# Patient Record
Sex: MALE | Race: WHITE | NOT HISPANIC OR LATINO | Employment: FULL TIME | URBAN - METROPOLITAN AREA
[De-identification: names, ages, dates, MRNs, and addresses within clinical notes are randomized per-mention and may not be internally consistent; named-entity substitution may affect disease eponyms.]

---

## 2018-01-18 ENCOUNTER — OFFICE VISIT (OUTPATIENT)
Dept: OPHTHALMOLOGY | Facility: CLINIC | Age: 23
End: 2018-01-18
Attending: OPHTHALMOLOGY

## 2018-01-18 ENCOUNTER — HOSPITAL ENCOUNTER (OUTPATIENT)
Facility: CLINIC | Age: 23
End: 2018-01-18
Attending: OPHTHALMOLOGY | Admitting: OPHTHALMOLOGY
Payer: COMMERCIAL

## 2018-01-18 DIAGNOSIS — Z01.00 EXAMINATION OF EYES AND VISION: Primary | ICD-10-CM

## 2018-01-18 DIAGNOSIS — H52.13 MYOPIA OF BOTH EYES: Primary | ICD-10-CM

## 2018-01-18 PROCEDURE — G0463 HOSPITAL OUTPT CLINIC VISIT: HCPCS | Mod: ZF

## 2018-01-18 ASSESSMENT — REFRACTION_WEARINGRX
OS_SPHERE: -5.25
OS_AXIS: 089
SPECS_TYPE: SVL
OD_SPHERE: -5.50
OD_CYLINDER: +2.00
OS_CYLINDER: +1.75
OD_AXIS: 108

## 2018-01-18 ASSESSMENT — PACHYMETRY
OD_CT(UM): 537
OS_CT(UM): 545

## 2018-01-18 ASSESSMENT — TONOMETRY
OS_IOP_MMHG: 20
IOP_METHOD: ICARE
OD_IOP_MMHG: 18

## 2018-01-18 ASSESSMENT — REFRACTION_MANIFEST
OD_AXIS: 103
OD_SPHERE: -5.50
OS_SPHERE: -5.25
OS_CYLINDER: +2.25
OD_CYLINDER: +2.00
OS_AXIS: 088

## 2018-01-18 ASSESSMENT — KERATOMETRY
OS_AXISANGLE2_DEGREES: 171
OD_K2POWER_DIOPTERS: 45.25
OS_AXISANGLE_DEGREES: 081
OS_K1POWER_DIOPTERS: 42.75
OD_K1POWER_DIOPTERS: 42.75
OD_AXISANGLE_DEGREES: 100
OS_K2POWER_DIOPTERS: 45.50
OD_AXISANGLE2_DEGREES: 010

## 2018-01-18 ASSESSMENT — REFRACTION
OD_SPHERE: -5.25
OS_CYLINDER: +2.25
OS_SPHERE: -5.25
OD_AXIS: 103
OD_CYLINDER: +2.00
OS_AXIS: 088

## 2018-01-18 ASSESSMENT — CUP TO DISC RATIO
OD_RATIO: 0.1
OS_RATIO: 0.1

## 2018-01-18 ASSESSMENT — SLIT LAMP EXAM - LIDS
COMMENTS: NORMAL
COMMENTS: NORMAL

## 2018-01-18 ASSESSMENT — VISUAL ACUITY
OD_SC: 20/300
OS_CC: J1+
OS_CC: 20/20
METHOD: SNELLEN - LINEAR
OS_SC: 20/300
OS_CC+: -3
OS_SC: J1
OD_SC: J1
OD_CC: 20/20
CORRECTION_TYPE: GLASSES
OD_CC+: -1
OD_CC: J1+

## 2018-01-18 ASSESSMENT — CONF VISUAL FIELD
OD_NORMAL: 1
OS_NORMAL: 1

## 2018-01-18 ASSESSMENT — EXTERNAL EXAM - LEFT EYE: OS_EXAM: NORMAL

## 2018-01-18 ASSESSMENT — EXTERNAL EXAM - RIGHT EYE: OD_EXAM: NORMAL

## 2018-01-18 NOTE — NURSING NOTE
Chief Complaints and History of Present Illnesses   Patient presents with     Consult For     LASIK     HPI    Affected eye(s):  Both   Symptoms:     No decreased vision   No floaters   No flashes   No redness   No foreign body sensation   No Dryness      Frequency:  Constant       Do you have eye pain now?:  No      Comments:  Pt here for LASIK consult  Pt has worn glasses x 20 years - tried CL's for about a week  Pt notes no changes in vision recently  Pt's last eye exam was just over a year ago (8/1/16 with Dr. Root at [facility])    No drops    Lisa Canada COA 9:17 AM January 18, 2018

## 2018-01-18 NOTE — MR AVS SNAPSHOT
After Visit Summary   1/18/2018    Ameya Jimenez    MRN: [id number]           Patient Information     Date Of Birth          1995        Visit Information        Provider Department      1/18/2018 9:00 AM Jarod Salguero MD [facility]        Today's Diagnoses     Myopia of both eyes    -  1       Follow-ups after your visit        Follow-up notes from your care team     Return for Follow Up postop.      Your next 10 appointments already scheduled     Mar 12, 2018   Procedure with Jarod Salguero MD   [facility] (--)    [address]  [place] [address]   [phone]            Mar 12, 2018   Procedure with Jarod Salguero MD   [facility] (--)    [address]  [place] [address]   [phone]            Mar 13, 2018  8:30 AM CDT   Post-Op with Jarod Salguero MD   Eye Clinic ([facility])    [address]  [address] [facility] [address]  [facility] [address] [phone]            Mar 20, 2018 10:30 AM CDT   Post-Op with Jarod Salguero MD   Eye Clinic ([facility])    [address]  [address]  [address] [facility] [address]  [facility] [address]   [phone]              Who to contact     Please call your clinic at [phone] to:    Ask questions about your health    Make or cancel appointments    Discuss your medicines    Learn about your test results    Speak to your doctor   If you have compliments or concerns about an experience at your clinic, or if you wish to file a complaint, please contact [facility] Patient Relations at [phone] or email us at [email or web address]         Additional Information About Your Visit        MyChart Information     [employer] gives you secure access to your electronic health record. If you see a primary care provider, you can also send messages to your care team and make  appointments. If you have questions, please call your primary care clinic.  If you do not have a primary care provider, please call [phone] and they will assist you.      [employer] is an electronic gateway that provides easy, online access to your medical records. With [employer], you can request a clinic appointment, read your test results, renew a prescription or communicate with your care team.     To access your existing account, please contact your [facility] or call [phone] for assistance.        Care EveryWhere ID     This is your Care EveryWhere ID. This could be used by other organizations to access your [place] medical records  [id number]         Blood Pressure from Last 3 Encounters:   No data found for BP    Weight from Last 3 Encounters:   No data found for Wt              We Performed the Following     Anamaria-Operative Worksheet (Ant Seg)        Primary Care Provider Fax #    Physician No Ref-Primary [phone]       No address on file        Equal Access to Services     [facility] Center[facility]WNYA : Hadii viviana perezo Darian, waaxda luqadaha, qaybta kaalmada adedennisyacleveland, bill gonzalez . So [facility] [phone].    ATENCIÓN: Si habla español, tiene a lin disposición servicios gratuitos de asistencia lingüística. Llame al [phone].    We comply with applicable federal civil rights laws and [place] laws. We do not discriminate on the basis of race, color, national origin, age, disability, sex, sexual orientation, or gender identity.            Thank you!     Thank you for choosing EYE CLINIC  for your care. Our goal is always to provide you with excellent care. Hearing back from our patients is one way we can continue to improve our services. Please take a few minutes to complete the written survey that you may receive in the mail after your visit with us. Thank you!             Your Updated Medication List - Protect others around you: Learn how to  safely use, store and throw away your medicines at www.disposemymeds.org.          This list is accurate as of: 1/18/18 12:22 PM.  Always use your most recent med list.                   Brand Name Dispense Instructions for use Diagnosis    VITAMIN D (CHOLECALCIFEROL) PO      Take 2,000 Int'l Units by mouth daily

## 2018-01-18 NOTE — PROGRESS NOTES
New laser in situ keratomileusis (LASIK) claudia     A 22 yr male    Says he is sick of glasses. He is planning to travel to [place] for 1 month and hopes to be out of glasses then.    History of trying CL for 1 week at age 12 yrs, did not tolerate CL and never tried again. His brother had K abrasion from CL and patient did not want to deal with complications.      Current MRx 4 years old. There was quarter diopter change in prescription over last 4 years.    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 History of archery, and says he switches his dominant eye     Pupil: 5.5 (light), 3.5 (dark) mm OU     Dry eye symptoms: none    history of color vision deficiency OU     Counseled at length re R/B/A refractive surgery, including laser in situ keratomileusis (LASIK) vs Photorefractive keratectomy (PRK)     Dry eye, night vision complaints, haloes, reduced contrast, flap complications, infection, vision loss, presbyopia, complexity of future intraocular lens calculations for cataract surgery, need for additional surgery all reviewed as risks     Monovision discussed, he prefers distance correction OU     Post LASIK presbyopia discussed.     Patient is very motivated and wishes to schedule surgery OU      REGIS Antoine  Cornea fellow          ~~~~~~~~~~~~~~~~~~~~~~~~~~~~~~~~~~~~~~~~~~~~~~~~~~~~~~~~~~~~~~~~    Complete documentation of historical and exam elements from today's encounter can be found in the full encounter summary report (not reduplicated in this progress note). I personally obtained the chief complaint(s) and history of present illness.  I confirmed and edited as necessary the review of systems,  past medical/surgical history, family history, social history, and examination findings as documented by others.  I examined the patient myself, and I personally reviewed the relevant tests, images, and reports as documented above. I formulated and edited as necessary the assessment and plan and discussed the findings and management plan with the patient and family.     Jarod Salguero MD, MA  Director, Cornea & Anterior Segment  [facility] Department of Ophthalmology & Visual Neuroscience

## 2018-03-12 ENCOUNTER — HOSPITAL ENCOUNTER (OUTPATIENT)
Facility: CLINIC | Age: 23
Discharge: HOME | End: 2018-03-12
Attending: OPHTHALMOLOGY | Admitting: OPHTHALMOLOGY
Payer: COMMERCIAL

## 2018-03-12 ENCOUNTER — APPOINTMENT (OUTPATIENT)
Dept: ADMISSION | Facility: CLINIC | Age: 23
End: 2018-03-12
Attending: OPHTHALMOLOGY

## 2018-03-12 ENCOUNTER — SURGERY (OUTPATIENT)
Age: 23
End: 2018-03-12

## 2018-03-12 PROCEDURE — 65760 KERATOMILEUSIS: CPT | Performed by: OPHTHALMOLOGY

## 2018-03-12 PROCEDURE — 92499 UNLISTED OPH SVC/PROCEDURE: CPT | Performed by: OPHTHALMOLOGY

## 2018-03-13 ENCOUNTER — OFFICE VISIT (OUTPATIENT)
Dept: OPHTHALMOLOGY | Facility: CLINIC | Age: 23
End: 2018-03-13
Attending: OPHTHALMOLOGY
Payer: COMMERCIAL

## 2018-03-13 DIAGNOSIS — Z98.890 STATUS POST LASIK SURGERY: Primary | ICD-10-CM

## 2018-03-13 PROCEDURE — G0463 HOSPITAL OUTPT CLINIC VISIT: HCPCS | Mod: ZF

## 2018-03-13 ASSESSMENT — VISUAL ACUITY
OD_SC+: -1
OS_SC: 20/20
OD_SC: 20/20
OS_SC+: -2
METHOD: SNELLEN - LINEAR

## 2018-03-13 ASSESSMENT — EXTERNAL EXAM - LEFT EYE: OS_EXAM: NORMAL

## 2018-03-13 ASSESSMENT — SLIT LAMP EXAM - LIDS
COMMENTS: NORMAL
COMMENTS: NORMAL

## 2018-03-13 ASSESSMENT — EXTERNAL EXAM - RIGHT EYE: OD_EXAM: NORMAL

## 2018-03-13 NOTE — MR AVS SNAPSHOT
After Visit Summary   3/13/2018    Ameya Jimenez    MRN: [id number]           Patient Information     Date Of Birth          1995        Visit Information        Provider Department      3/13/2018 8:30 AM Jarod Salguero MD [facility]        Today's Diagnoses     Status post LASIK surgery    -  1       Follow-ups after your visit        Follow-up notes from your care team     Return in about 1 week (around 3/20/2018) for Follow Up.      Your next 10 appointments already scheduled     May 15, 2018  8:15 AM CDT   Post-Op with Jarod Salguero MD   Eye Clinic ([facility])    [address] [place] [address] [phone]              Who to contact     Please call your clinic at [phone] to:    Ask questions about your health    Make or cancel appointments    Discuss your medicines    Learn about your test results    Speak to your doctor            Additional Information About Your Visit        MyChart Information     [employer] gives you secure access to your electronic health record. If you see a primary care provider, you can also send messages to your care team and make appointments. If you have questions, please call your primary care clinic.  If you do not have a primary care provider, please call [phone] and they will assist you.      [employer] is an electronic gateway that provides easy, online access to your medical records. With [employer], you can request a clinic appointment, read your test results, renew a prescription or communicate with your care team.     To access your existing account, please contact your [facility] or call [phone] for assistance.        Care EveryWhere ID     This is your Care EveryWhere ID. This could be used by other organizations to access your [place] medical records  [id number]         Blood Pressure from Last 3 Encounters:   No data found for BP     Weight from Last 3 Encounters:   No data found for Wt              Today, you had the following     No orders found for display       Primary Care Provider Fax #    Physician No Ref-Primary [phone]       No address on file        Equal Access to Services     FELIZAURA ELBERT : Yulia viviana morales ninoska Farmer, wasuzannada luqadaha, qaybta kaalmada linsey, bill milton laLeonstepan mesa. So [facility] [phone].    ATENCIÓN: Si habla español, tiene a lin disposición servicios gratuitos de asistencia lingüística. Llame al [phone].    We comply with applicable federal civil rights laws and [place] laws. We do not discriminate on the basis of race, color, national origin, age, disability, sex, sexual orientation, or gender identity.            Thank you!     Thank you for choosing EYE CLINIC  for your care. Our goal is always to provide you with excellent care. Hearing back from our patients is one way we can continue to improve our services. Please take a few minutes to complete the written survey that you may receive in the mail after your visit with us. Thank you!             Your Updated Medication List - Protect others around you: Learn how to safely use, store and throw away your medicines at www.disposemymeds.org.          This list is accurate as of 3/13/18  9:53 AM.  Always use your most recent med list.                   Brand Name Dispense Instructions for use Diagnosis    VITAMIN D (CHOLECALCIFEROL) PO      Take 2,000 Int'l Units by mouth daily

## 2018-03-13 NOTE — NURSING NOTE
Chief Complaints and History of Present Illnesses   Patient presents with     Post Op (Ophthalmology) Both Eyes     1 day s/p lasik BE     HPI    Affected eye(s):  Both   Symptoms:        Duration:  1 day   Frequency:  Constant       Do you have eye pain now?:  No      Comments:  Pt. States that yesterday went well.  No pain, just some discomfort BE.  VA has improved BE.  Corine Hernandez COT 8:43 AM March 13, 2018

## 2018-03-13 NOTE — PROGRESS NOTES
PODx1 S/p laser in situ keratomileusis (LASIK) both eyes     Interval history:   Doing well. No significant pain overnight. Fog/haze has become less prominent over the last 24 hours. Some mild irritation, and Subconjunctival hemorrhage right eye otherwise doing well.        Plan:  Off to a good start vision is 20/20 both eyes today   Reviewed instructions post operatively with patient  Start Ofloxacin four times a day  Start prednisolone four times a day  With weekly taper  Start ketorolac four times a day  4 day course  Start PFAT four times a day  And as needed therafter     Return to clinic 2 mo earlier as needed         Linnea Strange MD  Ophthalmology PGY3       ~~~~~~~~~~~~~~~~~~~~~~~~~~~~~~~~~~~~~~~~~~~~~~~~~~~~~~~~~~~~~~~~    Complete documentation of historical and exam elements from today's encounter can be found in the full encounter summary report (not reduplicated in this progress note). I personally obtained the chief complaint(s) and history of present illness.  I confirmed and edited as necessary the review of systems, past medical/surgical history, family history, social history, and examination findings as documented by others.  I examined the patient myself, and I personally reviewed the relevant tests, images, and reports as documented above. I formulated and edited as necessary the assessment and plan and discussed the findings and management plan with the patient and family.     Jarod Salguero MD, MA  Director, Cornea & Anterior Segment  [facility] Department of Ophthalmology & Visual Neuroscience

## 2018-05-15 ENCOUNTER — OFFICE VISIT (OUTPATIENT)
Dept: OPHTHALMOLOGY | Facility: CLINIC | Age: 23
End: 2018-05-15
Attending: OPHTHALMOLOGY
Payer: COMMERCIAL

## 2018-05-15 DIAGNOSIS — Z98.890 S/P LASIK SURGERY: Primary | ICD-10-CM

## 2018-05-15 PROCEDURE — 92025 CPTRIZED CORNEAL TOPOGRAPHY: CPT | Mod: ZF | Performed by: OPHTHALMOLOGY

## 2018-05-15 PROCEDURE — G0463 HOSPITAL OUTPT CLINIC VISIT: HCPCS | Mod: ZF

## 2018-05-15 ASSESSMENT — TONOMETRY
OS_IOP_MMHG: 14
IOP_METHOD: ICARE
OD_IOP_MMHG: 13

## 2018-05-15 ASSESSMENT — SLIT LAMP EXAM - LIDS
COMMENTS: NORMAL
COMMENTS: NORMAL

## 2018-05-15 ASSESSMENT — VISUAL ACUITY
METHOD: SNELLEN - LINEAR
OS_SC: 20/20
OD_SC: 20/20

## 2018-05-15 ASSESSMENT — EXTERNAL EXAM - LEFT EYE: OS_EXAM: NORMAL

## 2018-05-15 ASSESSMENT — CONF VISUAL FIELD
OD_NORMAL: 1
OS_NORMAL: 1
METHOD: COUNTING FINGERS

## 2018-05-15 ASSESSMENT — REFRACTION_WEARINGRX: SPECS_TYPE: SVL

## 2018-05-15 ASSESSMENT — EXTERNAL EXAM - RIGHT EYE: OD_EXAM: NORMAL

## 2018-05-15 NOTE — MR AVS SNAPSHOT
After Visit Summary   5/15/2018    Ameya Jimenez    MRN: [id number]           Patient Information     Date Of Birth          1995        Visit Information        Provider Department      5/15/2018 8:15 AM Jarod Salguero MD [facility]        Today's Diagnoses     S/P LASIK surgery    -  1       Follow-ups after your visit        Who to contact     Please call your clinic at [phone] to:    Ask questions about your health    Make or cancel appointments    Discuss your medicines    Learn about your test results    Speak to your doctor            Additional Information About Your Visit        MyChart Information     [employer] gives you secure access to your electronic health record. If you see a primary care provider, you can also send messages to your care team and make appointments. If you have questions, please call your primary care clinic.  If you do not have a primary care provider, please call [phone] and they will assist you.      [employer] is an electronic gateway that provides easy, online access to your medical records. With [employer], you can request a clinic appointment, read your test results, renew a prescription or communicate with your care team.     To access your existing account, please contact your [facility] or call [phone] for assistance.        Care EveryWhere ID     This is your Care EveryWhere ID. This could be used by other organizations to access your [place] medical records  [id number]         Blood Pressure from Last 3 Encounters:   No data found for BP    Weight from Last 3 Encounters:   No data found for Wt              We Performed the Following     Corneal Topography OU (both eyes)        Primary Care Provider Fax #    Physician No Ref-Primary [phone]       No address on file        Equal Access to Services     SENA BOSWELL : Yulia Farmer, johanna wu, bill laughlin  liyah moerajathellen royLeonstepan ah. So [facility] [phone].    ATENCIÓN: Si habla osiris, tiene a lin disposición servicios gratuitos de asistencia lingüística. Tangela al [phone].    We comply with applicable federal civil rights laws and [place] laws. We do not discriminate on the basis of race, color, national origin, age, disability, sex, sexual orientation, or gender identity.            Thank you!     Thank you for choosing EYE CLINIC  for your care. Our goal is always to provide you with excellent care. Hearing back from our patients is one way we can continue to improve our services. Please take a few minutes to complete the written survey that you may receive in the mail after your visit with us. Thank you!             Your Updated Medication List - Protect others around you: Learn how to safely use, store and throw away your medicines at www.disposemymeds.org.          This list is accurate as of 5/15/18  9:13 AM.  Always use your most recent med list.                   Brand Name Dispense Instructions for use Diagnosis    VITAMIN D (CHOLECALCIFEROL) PO      Take 2,000 Int'l Units by mouth daily

## 2018-05-15 NOTE — PROGRESS NOTES
POMx2 S/p laser in situ keratomileusis (LASIK) both eyes     Interval history:   Doing well. Vision has improved.  Some mild dryness improved by drops.       Plan:  Doing great   vision is 20/20 both eyes today   Continue PFAT as needed    F/u 1 year    Ab Moore, DO  Cornea Fellow      ~~~~~~~~~~~~~~~~~~~~~~~~~~~~~~~~~~~~~~~~~~~~~~~~~~~~~~~~~~~~~~~~    Complete documentation of historical and exam elements from today's encounter can be found in the full encounter summary report (not reduplicated in this progress note). I personally obtained the chief complaint(s) and history of present illness.  I confirmed and edited as necessary the review of systems, past medical/surgical history, family history, social history, and examination findings as documented by others.  I examined the patient myself, and I personally reviewed the relevant tests, images, and reports as documented above. I formulated and edited as necessary the assessment and plan and discussed the findings and management plan with the patient and family.     Jarod Salguero MD, MA  Director, Cornea & Anterior Segment  [facility] Department of Ophthalmology & Visual Neuroscience

## 2020-02-14 ENCOUNTER — OFFICE VISIT (OUTPATIENT)
Dept: DERMATOLOGY | Facility: CLINIC | Age: 25
End: 2020-02-14
Payer: COMMERCIAL

## 2020-02-14 VITALS — DIASTOLIC BLOOD PRESSURE: 74 MMHG | HEART RATE: 61 BPM | SYSTOLIC BLOOD PRESSURE: 117 MMHG | OXYGEN SATURATION: 98 %

## 2020-02-14 DIAGNOSIS — L73.1 PSEUDOFOLLICULITIS BARBAE: ICD-10-CM

## 2020-02-14 DIAGNOSIS — L70.0 ACNE VULGARIS: Primary | ICD-10-CM

## 2020-02-14 RX ORDER — TRETINOIN 0.25 MG/G
CREAM TOPICAL
Qty: 45 G | Refills: 3 | Status: SHIPPED | OUTPATIENT
Start: 2020-02-14 | End: 2020-05-06

## 2020-02-14 RX ORDER — BENZOYL PEROXIDE 10 G/100G
SUSPENSION TOPICAL
Qty: 227 G | Refills: 5 | Status: SHIPPED | OUTPATIENT
Start: 2020-02-14 | End: 2020-05-06

## 2020-02-14 RX ORDER — CLINDAMYCIN PHOSPHATE 10 UG/ML
LOTION TOPICAL 2 TIMES DAILY
Qty: 60 ML | Refills: 11 | Status: SHIPPED | OUTPATIENT
Start: 2020-02-14 | End: 2020-05-06

## 2020-02-14 RX ORDER — OMEPRAZOLE 10 MG/1
CAPSULE, DELAYED RELEASE ORAL
COMMUNITY

## 2020-02-14 ASSESSMENT — PAIN SCALES - GENERAL: PAINLEVEL: NO PAIN (0)

## 2020-02-14 NOTE — LETTER
2/14/2020       RE: Ameya Jimenez  [address]  [address]  [place] [address]     Dear Colleague,    Thank you for referring your patient, Ameya Jimenez, to the [facility] DERMATOLOGY at [facility]. Please see a copy of my visit note below.    [facility] Dermatology Note      Dermatology Problem List:  1. Acne vulgaris/pseudofolliculits barbae  - BPO/clindamycin, tretinoin 0.025% cream    Encounter Date: Feb 14, 2020    CC:   Chief Complaint   Patient presents with     Derm Problem     Ameya, is being seen today for a  consult regarding acne on face & shoulders, as reported by patient.       History of Present Illness:  Mr. Ameya Jmienez is a 24 year old male who presents in self referral for acne.    He has a history of acne since puberty. It has been persistent since that time on the face, shoulders, back and flanks. He has a mixture of closed comedones and painful red cysts, usually on the forehead, temples. He occasionally gets pimples on his neck and jawline, usually related to shaving. He shaves every other day and uses a safety razor, which he changes often. He had a short course of Accutane when he was a teenager, but did not complete a full course (finished 3/4). He noted some improvement afterwards in the severity of his acne. He currently uses Cetaphil cleanser and Cera Ve moisturizer. He has not used any topical prescriptions for acne.      Pertinent Social History: Chemistry PhD student at the [employer]    Past Medical History:   Patient Active Problem List   Diagnosis     Acne vulgaris     Pseudofolliculitis barbae     No significant past medical history    No past medical history on file.  Past Surgical History:   Procedure Laterality Date     KNEE SURGERY Left 09/2006    repair damage from dislocation     LASIK CUSTOMVUE BILATERAL Bilateral 3/12/2018    Procedure: LASIK CUSTOMVUE BILATERAL;  BILATERALCUSTOMUVE LASIK WITH INTRALASE;   Surgeon: Jarod Salguero MD;  Location: [facility]     sinoplasty  06/2013     WAVESCAN SCREENING Bilateral 3/12/2018    Procedure: WAVESCAN SCREENING;  BILATERAL WAVESCAN;  Surgeon: Jarod Salguero MD;  Location: [facility]       Social History:  Patient reports that he has never smoked. He has never used smokeless tobacco.    Family History:  Family History   Problem Relation Age of Onset     Diabetes Mother      Glaucoma No family hx of      Macular Degeneration No family hx of      FH of severe acne in both parents, even as adults.    Medications:  Current Outpatient Medications   Medication Sig Dispense Refill     benzoyl peroxide (PANOXYL) 10 % external liquid Use daily as directed 227 g 5     cetirizine HCl 10 MG CAPS        clindamycin (CLEOCIN T) 1 % external lotion Apply topically 2 times daily Apply after BPO wash 60 mL 11     omeprazole (PRILOSEC) 10 MG DR capsule        tretinoin (RETIN-A) 0.025 % external cream Use every other night for 1 week, increase to nightly as tolerated 45 g 3     VITAMIN D, CHOLECALCIFEROL, PO Take 2,000 Int'l Units by mouth daily          Allergies   Allergen Reactions     Opium Nausea and Vomiting     opiates         Review of Systems:  - As per HPI  - Constitutional: Otherwise feeling well today, in usual state of health.  - HEENT: Patient denies nonhealing oral sores.  - Skin: As above in HPI. No additional skin concerns.    Physical exam:  Vitals: /74 (BP Location: Right arm, Patient Position: Chair, Cuff Size: Adult Large)   Pulse 61   SpO2 98%   GEN: This is a well developed, well-nourished male in no acute distress, in a pleasant mood.    SKIN: Acne exam, which includes the face, neck, upper central chest, abdomen, and upper central back was performed.  - Coleman skin type: III  - Few closed comedones on the anterior lower neck, temples, flanks  - Few pink papules and cysts on the underside of the jaw and anterior lower neck  - Excoriated pink papules on the  upper back and central chest  - Hyperpigmented macules in prior areas of breakouts on the forehead, temples, cheeks, and back  - Multiple striae on the bilateral flanks  - No other lesions of concern on areas examined.     Impression/Plan:    1. Acne vulgaris, mild to moderate with component of pseudofolliculitis barbae  - Recommend BPO 5% or 10% wash daily to face and back, handout provided  - Clindamycin 1% lotion to face and back, after BPO wash  - At night, use Cetaphil cleanser, then tretinoin 0.025% cream nightly. Counseled patient to slowly titrate up use as tolerated  - Future consideration: 3 month course of doxycycline  - Patient currently does not have severe acne to warrant isotretinoin, but this may be entertained if he remains recalcitrant    Follow-up in 3 months, earlier for new or changing lesions.       Dr. Waite staffed the patient.    Rita Ko MD  PGY-3 Medicine-Dermatology  Pager [phone]      Staff Involved:  Resident(Rita Ko MD)/Staff(as above)    Staff Physician Comments:   I saw and evaluated the patient with the resident and I edited the assessment and plan as documented in the note. I was present for the examination.    Ameya Waite MD   of Dermatology  Department of Dermatology  [facility]

## 2020-02-14 NOTE — PROGRESS NOTES
[facility] Dermatology Note      Dermatology Problem List:  1. Acne vulgaris/pseudofolliculits barbae  - BPO/clindamycin, tretinoin 0.025% cream    Encounter Date: Feb 14, 2020    CC:   Chief Complaint   Patient presents with     Derm Problem     Ameya, is being seen today for a  consult regarding acne on face & shoulders, as reported by patient.       History of Present Illness:  Mr. Ameya Jimenez is a 24 year old male who presents in self referral for acne.    He has a history of acne since puberty. It has been persistent since that time on the face, shoulders, back and flanks. He has a mixture of closed comedones and painful red cysts, usually on the forehead, temples. He occasionally gets pimples on his neck and jawline, usually related to shaving. He shaves every other day and uses a safety razor, which he changes often. He had a short course of Accutane when he was a teenager, but did not complete a full course (finished 3/4). He noted some improvement afterwards in the severity of his acne. He currently uses Cetaphil cleanser and Cera Ve moisturizer. He has not used any topical prescriptions for acne.      Pertinent Social History: Chemistry PhD student at the [employer]    Past Medical History:   Patient Active Problem List   Diagnosis     Acne vulgaris     Pseudofolliculitis barbae     No significant past medical history    No past medical history on file.  Past Surgical History:   Procedure Laterality Date     KNEE SURGERY Left 09/2006    repair damage from dislocation     LASIK CUSTOMVUE BILATERAL Bilateral 3/12/2018    Procedure: LASIK CUSTOMVUE BILATERAL;  BILATERALCUSTOMUVE LASIK WITH INTRALASE;  Surgeon: Jarod Salguero MD;  Location: [facility]     sinoplasty  06/2013     WAVESCAN SCREENING Bilateral 3/12/2018    Procedure: WAVESCAN SCREENING;  BILATERAL WAVESCAN;  Surgeon: Jarod Salguero MD;  Location: [facility]       Social History:  Patient reports that he has never smoked. He  has never used smokeless tobacco.    Family History:  Family History   Problem Relation Age of Onset     Diabetes Mother      Glaucoma No family hx of      Macular Degeneration No family hx of      FH of severe acne in both parents, even as adults.    Medications:  Current Outpatient Medications   Medication Sig Dispense Refill     benzoyl peroxide (PANOXYL) 10 % external liquid Use daily as directed 227 g 5     cetirizine HCl 10 MG CAPS        clindamycin (CLEOCIN T) 1 % external lotion Apply topically 2 times daily Apply after BPO wash 60 mL 11     omeprazole (PRILOSEC) 10 MG DR capsule        tretinoin (RETIN-A) 0.025 % external cream Use every other night for 1 week, increase to nightly as tolerated 45 g 3     VITAMIN D, CHOLECALCIFEROL, PO Take 2,000 Int'l Units by mouth daily          Allergies   Allergen Reactions     Opium Nausea and Vomiting     opiates         Review of Systems:  - As per HPI  - Constitutional: Otherwise feeling well today, in usual state of health.  - HEENT: Patient denies nonhealing oral sores.  - Skin: As above in HPI. No additional skin concerns.    Physical exam:  Vitals: /74 (BP Location: Right arm, Patient Position: Chair, Cuff Size: Adult Large)   Pulse 61   SpO2 98%   GEN: This is a well developed, well-nourished male in no acute distress, in a pleasant mood.    SKIN: Acne exam, which includes the face, neck, upper central chest, abdomen, and upper central back was performed.  - Coleman skin type: III  - Few closed comedones on the anterior lower neck, temples, flanks  - Few pink papules and cysts on the underside of the jaw and anterior lower neck  - Excoriated pink papules on the upper back and central chest  - Hyperpigmented macules in prior areas of breakouts on the forehead, temples, cheeks, and back  - Multiple striae on the bilateral flanks  - No other lesions of concern on areas examined.     Impression/Plan:    1. Acne vulgaris, mild to moderate with component  of pseudofolliculitis barbae  - Recommend BPO 5% or 10% wash daily to face and back, handout provided  - Clindamycin 1% lotion to face and back, after BPO wash  - At night, use Cetaphil cleanser, then tretinoin 0.025% cream nightly. Counseled patient to slowly titrate up use as tolerated  - Future consideration: 3 month course of doxycycline  - Patient currently does not have severe acne to warrant isotretinoin, but this may be entertained if he remains recalcitrant    Follow-up in 3 months, earlier for new or changing lesions.       Dr. Waite staffed the patient.    Rita Ko MD  PGY-3 Medicine-Dermatology  Pager [phone]      Staff Involved:  Resident(Rita Ko MD)/Staff(as above)    Staff Physician Comments:   I saw and evaluated the patient with the resident and I edited the assessment and plan as documented in the note. I was present for the examination.    Ameya Waite MD   of Dermatology  Department of Dermatology  [facility]

## 2020-02-14 NOTE — NURSING NOTE
Dermatology Rooming Note    Ameya Jimenez's goals for this visit include:   Chief Complaint   Patient presents with     Derm Problem     Ameya, is being seen today for a  consult regarding acne on face & shoulders, as reported by patient.     Mary Guillen LPN

## 2020-02-14 NOTE — PATIENT INSTRUCTIONS
Mild Acne  Recommendations for Care;    Wash face every night with a gentle cleanser.   o Brands of Gentle Cleanser; Neutrogena, Cetaphil, Purpose, Clinique bar, Basis and Vanicream cleansing bar.    Your doctor may recommend the use of an over the counter Benzoyl peroxide product (Neutrogena Clear Pore, Clean and Clear) and a gentle soap (Dove, Purpose, Cetaphil) or Salicylic Acid wash (Neutrogena Acne Wash). Additional recommended products: Neutrogena Oil-Free, Creamy Wash. Note- Aggressive scrubbing is NOT helpful.    A facial moisturizer should be applied. If you use makeup or sunscreen make sure that it is labeled  non-comedogenic  which means that it will not aggravate or cause acne. Try not to pick at your acne as this can delay healing and may lead to scarring.  o Brands; Vanicream, Cetaphil, Neutrogena, Clinique, CeraVe      Additional tips:    Washing your face with a gentle cleanser is recommended following an athletic activity, but do not over wash as this will make the skin more sensitive.    Try not to  pop  pimples, this can cause a delay in healing and can lead to scarring.     Make sure you are reading product labels.     Change your pillow case 1-2 times per week.     WHAT IS ACNE AND WHY DO I HAVE PIMPLES?  The medical term for  pimples  is acne. Most people get a least some acne. Many people also need acne medication. Your doctor will tell you if they think you are one of those people. The good news is that the medicine really works well when used properly.  Acne does not come from being dirty, but washing your face is part of taking care of your skin and will help keep your face clear. People with acne have glands that make more oil and are more easily plugged, causing the glands to swell and create blackheads and whiteheads. Hormones, bacteria and your inherited tendency to have acne all play a role.      Morning: Benzoyl peroxide wash, followed by clindamycin lotion    Evening: pea sized  amount of tretinoin 0.025% cream to face

## 2020-02-16 PROBLEM — L73.1 PSEUDOFOLLICULITIS BARBAE: Status: ACTIVE | Noted: 2020-02-16

## 2020-02-16 PROBLEM — L70.0 ACNE VULGARIS: Status: ACTIVE | Noted: 2020-02-16

## 2020-03-11 ENCOUNTER — HEALTH MAINTENANCE LETTER (OUTPATIENT)
Age: 25
End: 2020-03-11

## 2020-03-11 ENCOUNTER — TELEPHONE (OUTPATIENT)
Dept: DERMATOLOGY | Facility: CLINIC | Age: 25
End: 2020-03-11

## 2020-05-06 ENCOUNTER — VIRTUAL VISIT (OUTPATIENT)
Dept: DERMATOLOGY | Facility: CLINIC | Age: 25
End: 2020-05-06
Payer: COMMERCIAL

## 2020-05-06 DIAGNOSIS — L73.1 PSEUDOFOLLICULITIS BARBAE: ICD-10-CM

## 2020-05-06 DIAGNOSIS — L70.0 ACNE VULGARIS: Primary | ICD-10-CM

## 2020-05-06 RX ORDER — BENZOYL PEROXIDE 10 G/100G
SUSPENSION TOPICAL
Qty: 227 G | Refills: 11 | Status: SHIPPED | OUTPATIENT
Start: 2020-05-06

## 2020-05-06 RX ORDER — CLINDAMYCIN PHOSPHATE 10 UG/ML
LOTION TOPICAL 2 TIMES DAILY
Qty: 60 ML | Refills: 11 | Status: SHIPPED | OUTPATIENT
Start: 2020-05-06

## 2020-05-06 RX ORDER — TRETINOIN 0.25 MG/G
CREAM TOPICAL
Qty: 45 G | Refills: 4 | Status: SHIPPED | OUTPATIENT
Start: 2020-05-06

## 2020-05-06 ASSESSMENT — PAIN SCALES - GENERAL: PAINLEVEL: NO PAIN (0)

## 2020-05-06 NOTE — PROGRESS NOTES
[facility]  Store and Forward and Video (Invitation sent by: Lesia and send to e-mail at: [email or web address])      Impression and Recommendations (Patient Counseled on the Following):  1. Acne vulgaris with mild-moderate component of pseudofolliculitis barbae   Significantly improved on current topical regimen. No need for doxycyline at this time but can consider in the future as needed.   - morning: BPO 5-10% wash +/- cetaphil wash, then clindamycin 1% lotion as needed, then facial moisturizer   - evening: BPO 5-10% wash +/- cetaphil wash, then tretinoin 0.025% cream +/- clindamycin 1% lotion as needed    - recommend single blade razor to reduce component of pseudofolliculitis barbae  - refills of all medications provided given upcoming move out of state        Follow-up:   Follow-up with dermatology in approximately 6 months. Earlier for new or changing lesions or rash.      Staff and resident:    Dr. Waite was present during the entirety of this encounter.     Sally Peres MD (PGY-2)  Dermatology Resident     Staff Physician Comments:   I evaluated any available patient photographs with the resident and I edited the assessment and plan as documented in the note. I was present on the line and participated in the entire telephone call.    Ameya Waite MD   of Dermatology  Department of Dermatology  [facility]            _____________________________________________________________________________    Dermatology Problem List:  1. Acne vulgaris/pseudofolliculits barbae  - BPO/clindamycin, tretinoin 0.025% cream    Encounter Date: May 6, 2020    CC:   Chief Complaint   Patient presents with     Derm Problem     Ameya states his acne has been stable       History of Present Illness:  I have reviewed the teledermatology information and the nursing intake corresponding to this issue. Ameya Jimenez is a 25 year old male who presents via  teledermatology for follow-up acne.    Patient last seen 2/14/20, when he was started on BPO 5-10% wash daily to face/back, clindamycin 1% lotion to face/back after BPO wash, and tretinoin 0.025% cream nightly for acne vulgaris with mild to moderate component of pseudofolliculitis barbae.     Today, patient reports that he is using cetaphil cleanser in the morning, then BPO, then cerave daytime face moisturizer then clindamycin. At night, he uses cetaphil then BPO then tretinoin then clindamycin. Develops 1-2 pimples once a week at most. He is very happy with his skin and does not want to pursue more aggressive treatment at this time. Moving out of state in the near future.     ROS: Patient is generally feeling well today    Physical Examination:  General: Well-appearing male, appropriately-developed individual.  Skin: Focused examination within the teledermatology photograph(s) including face and neck was performed.   -L cheek with one inflammatory papule  -Jawline with few folliculocentric erythematous papules     Labs:  None    Past Medical History:   Patient Active Problem List   Diagnosis     Acne vulgaris     Pseudofolliculitis barbae     No past medical history on file.  Past Surgical History:   Procedure Laterality Date     KNEE SURGERY Left 09/2006    repair damage from dislocation     LASIK CUSTOMVUE BILATERAL Bilateral 3/12/2018    Procedure: LASIK CUSTOMVUE BILATERAL;  BILATERALCUSTOMUVE LASIK WITH INTRALASE;  Surgeon: Jarod Salguero MD;  Location: [facility]     sinoplasty  06/2013     WAVESCAN SCREENING Bilateral 3/12/2018    Procedure: WAVESCAN SCREENING;  BILATERAL WAVESCAN;  Surgeon: Jarod Salguero MD;  Location: [facility]       Social History:  Patient reports that he has never smoked. He has never used smokeless tobacco.    Chemistry PhD student at the [employer]     Family History:  Family History   Problem Relation Age of Onset     Diabetes Mother      Glaucoma No family hx of      Macular  Degeneration No family hx of        Medications:  Current Outpatient Medications   Medication     benzoyl peroxide (PANOXYL) 10 % external liquid     cetirizine HCl 10 MG CAPS     clindamycin (CLEOCIN T) 1 % external lotion     omeprazole (PRILOSEC) 10 MG DR capsule     tretinoin (RETIN-A) 0.025 % external cream     VITAMIN D, CHOLECALCIFEROL, PO     No current facility-administered medications for this visit.      Allergies   Allergen Reactions     Opium Nausea and Vomiting     opiates     _____________________________________________________________________________    Teledermatology information:  - Location of patient: Home  - Patient presented as: return  - Location of teledermatologist:  ([facility] D[facility] )  - Reason teledermatology is appropriate:  of National Emergency Regarding Coronavirus disease (COVID 19) Outbreak  - Image quality and interpretability: acceptable  - Physician has received verbal consent for a Video/Photos Visit from the patient? Yes  - In-person dermatology visit recommendation: no  - Date of images: 5/6/20  - Service start time: 8:15AM  - Service end time: 8:26 AM  - Date of report: 5/6/2020

## 2020-05-06 NOTE — PATIENT INSTRUCTIONS
[facility] Teledermatology Visit    Thank you for allowing us to participate in your care. Your findings, instructions and follow-up plan are as follows:    Acne:   - morning: benzoyl peroxide wash +/- cetaphil wash, then clindamycin as needed, then moisturizer   - evening: benzoyl peroxide wash +/- cetaphil wash, then tretinoin +/- clindamycin as needed   - only use clindamycin if you are also using BPO wash, otherwise can develop bacterial resistance  - can try weaning off clindamycin and only using it as needed for new pimples but also okay to use 2x/day as long as also using BPO benzoyl peroxide wash  - refills provided today    Razor burn: consider single blade razor to reduce razor burn      When should I call my doctor?    If you are worsening or not improving, please, contact us or seek urgent care as noted below.     Who should I call with questions (adults)?    [facility] (adult and pediatric): [phone]     [facility] (adult): [phone]    For urgent needs outside of business hours call the [facility] at [phone] and ask for the dermatology resident on call    If this is a medical emergency and you are unable to reach an ER, Call [phone]

## 2020-12-27 ENCOUNTER — HEALTH MAINTENANCE LETTER (OUTPATIENT)
Age: 25
End: 2020-12-27

## 2021-04-25 ENCOUNTER — HEALTH MAINTENANCE LETTER (OUTPATIENT)
Age: 26
End: 2021-04-25

## 2021-06-04 DIAGNOSIS — L70.0 ACNE VULGARIS: ICD-10-CM

## 2021-06-05 RX ORDER — CLINDAMYCIN PHOSPHATE 10 UG/ML
LOTION TOPICAL 2 TIMES DAILY
Qty: 60 ML | OUTPATIENT
Start: 2021-06-05

## 2021-06-05 NOTE — TELEPHONE ENCOUNTER
"  clindamycin (CLEOCIN T) 1 % external lotion  Last Written Prescription Date:  5/6/20  Last Fill Quantity: 60ml,   # refills: 11  Last Office Visit : 5/6/20  Future Office visit: none    \" Follow-up with dermatology in approximately 6 months\"    Scheduling has been notified to contact the pt for appointment.       Process 4    Routing refill request to provider for review/approval because: past due for appt.  rf or refuse?  "

## 2021-10-09 ENCOUNTER — HEALTH MAINTENANCE LETTER (OUTPATIENT)
Age: 26
End: 2021-10-09

## 2022-05-21 ENCOUNTER — HEALTH MAINTENANCE LETTER (OUTPATIENT)
Age: 27
End: 2022-05-21

## 2022-09-17 ENCOUNTER — HEALTH MAINTENANCE LETTER (OUTPATIENT)
Age: 27
End: 2022-09-17

## 2023-06-04 ENCOUNTER — HEALTH MAINTENANCE LETTER (OUTPATIENT)
Age: 28
End: 2023-06-04

## (undated) RX ORDER — DIAZEPAM 5 MG
TABLET ORAL
Status: DISPENSED
Start: 2018-03-12